# Patient Record
Sex: FEMALE | Race: OTHER | HISPANIC OR LATINO | URBAN - METROPOLITAN AREA
[De-identification: names, ages, dates, MRNs, and addresses within clinical notes are randomized per-mention and may not be internally consistent; named-entity substitution may affect disease eponyms.]

---
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
## 2023-06-16 ENCOUNTER — TELEPHONE (OUTPATIENT)
Dept: OBSTETRICS AND GYNECOLOGY | Facility: CLINIC | Age: 18
End: 2023-06-16
Payer: COMMERCIAL

## 2023-06-16 NOTE — TELEPHONE ENCOUNTER
Attempted to call pt with no answer. LM on voicemail advising her to call office with any questions. As far as I can see she has never seen Dr. Lauren.

## 2023-06-16 NOTE — TELEPHONE ENCOUNTER
----- Message from Paige Ferreira sent at 6/16/2023  9:14 AM CDT -----  Contact: self  Prior office visits dates pls call [phone] with any questions

## 2024-11-25 NOTE — PROGRESS NOTES
Neurology Headache Clinic - [facility]  New Patient Visit     Subjective      REFERRAL SOURCE  Self, Aaareferral          CHIEF COMPLAINT/REASON FOR REFERRAL  Headache     HISTORY OF PRESENT ILLNESS  Kaylee Longoria is a 19 y.o. woman with Weill-Marchesani syndrome (connective tissue disorder), benign hypermobility syndrome, dysmenorrhea, who is presenting to the [facility] for an office visit with a chief complaint of headache.     The patient states that they began to experience headaches around childhood. Headache was not bad at that time. She remembers taking over the counter medication (Advil) for pain that helped sometimes. She had headache with menstrual period and remembers that sound sensitivity. She had nausea without vomiting. In her teenage years, headache stayed the same. Over the last 3 years, headache has been the same until recently. She says that around 2024, headache worsened and became daily.      Current headache characteristics:  Headache Frequency:        Total: 30        Disablin     Duration of attacks: days  Timing to max pain: hours   Time of day when headache is worse?: yes - around the middle of the day  Headache location:    right frontal, temporal, and occipital  Quality: throbbing / pulsating  Intensity: mild moderate moderate-to-severe severe: moderate to severe  Associated symptoms: photophobia, phonophobia, aggravation with routine physical activity, and nausea  Unilateral cranial autonomic features or restlessness: none  Premonitory symptoms: sound sensitivity  Aura symptoms:   Type: sensory  not really associated with headache - infraorbital bilateral   Duration: migraine aura duration: 1 - 5 minutes (atypical for aura)  Headache Red Flags:        Fevers/Chills/Unintended Weight Loss: no        Focal weakness: no        Thunderclap onset: yes - 4-5 times per week        Start a headache with coughing/sneezing/bending over: no        Headache  "absolutely worse with certain positions (lying down vs standing up): yes - upright because not resting        Double vision: no        Loss of color vision: no        Pulsatile or whooshing tinnitus: yes - she hears different noises all the time  Triggers: yes - taking off her cochlear implant; weather changes for sure  Neck pain: yes - bilateral R>>L Radiation up and down  Jaw pain/cramping with chewing, e.g., starts/stops when chewing due to pain/fatigue, lockjaw/decreased opening or cramping (including tongue) when eating: no  Symptoms of dysautonomia: postural lightheadedness / dizziness, near fainting when upright, and feels full quickly (early satiety)     Prior non-pharm treatments (biofeedback, CBT, PT, chiropractor, acupuncture, massage): no  History of asthma, constipation, kidney stones: no  Psychiatric comorbidities: yes - anxiety  History of Head Trauma: no  Hypertension, Hyperlipidemia: no  Prior stroke: no  Coronary artery disease: no  Vascular malformation or aneurysm: no - unknown EDS subtype  Caffeine use: yes - 5-6 cups of coffee per day; dr sosa x 1  Sleep comorbidities: Snoring absent, witnessed apneas absent, sleep study no     Family history of headaches:  no     Last dilated eye exam: within the last 12 months Any abnormalities? no     Menstrual status:  Did attacks start around menarche? no  Does the patient currently get their period? yes  Worsening of migraine during menses? yes  Does the patient use contraception? Type? no  Is the patient currently pregnant or planning pregnancy in the near future? no  Is the patient menopausal? no  Using HRT? no     Social History:  The patient lives in [place], [place].   Employment: no - on leave currently from [employer] because of her pain   Tobacco use: no  Alcohol use: no  Substances: no  Mood: "Up and down"      ----------------     Current Acute Headache Medications:  -- Rizatriptan 10 mg - helps within 1 hr and decreases pain     Number of Days " "with acute medication use per week: 3      Current Prophylactic Headache Medications:  -- sertraline 25 mg daily     Previous headache treatment that was ineffective or not tolerated:  Acute: None  Preventive: fluoxetine 20 mg  Devices: None    Procedures: None    ----------------     Past Medical History:   Diagnosis Date    Deaf     Mental disorder         Current Outpatient Medications on File Prior to Visit   Medication Sig Dispense Refill    hyoscyamine 0.125 mg TbDL ODT tablet Take 0.125 mg by mouth 2 (two) times daily as needed.      l-methylfolate-b2-b6-b12 (CEREFOLIN) 6-5-50-1 mg Tab Take 1 tablet by mouth once daily.      levocetirizine (XYZAL) 5 MG tablet Take 5 mg by mouth every evening.      magnesium oxide (MAG-OX) 400 mg (241.3 mg magnesium) tablet Take 120 mg by mouth once daily.      sertraline (ZOLOFT) 25 MG tablet Take 25 mg by mouth.      traZODone (DESYREL) 50 MG tablet Take 50 mg by mouth every evening.      [DISCONTINUED] FLUoxetine 20 MG capsule Take by mouth.      ascorbic acid, vitamin C, (VITAMIN C) 500 MG tablet Take 500 mg by mouth once daily.      turmeric root-ginger root ext 150-25 mg Chew Take 250 mg by mouth.      [DISCONTINUED] norethindrone-ethinyl estradiol (MICROGESTIN 1/20) 1-20 mg-mcg per tablet Take 1 tablet by mouth once daily. 90 tablet 3     No current facility-administered medications on file prior to visit.        REVIEW OF SYSTEMS  As above     Objective      PHYSICAL EXAMINATION    Blood pressure 96/64, pulse 72, height 4' 11" (1.499 m), weight 62.5 kg (137 lb 12.6 oz).     General Exam: The patient is in no acute distress.  Psychiatric: The patient is alert, interactive, and has appropriate mood and affect.    Head and Neck:  Supratrochlear tenderness: Yes right  Supraorbital tenderness: Yes right  Auriculotemporal tenderness: Yes right  Lesser occipital tenderness: Yes right  Greater occipital tenderness: Yes right  Cervical paraspinal muscle tenderness: Yes " "right  Cervical ROM (normal flexion 50'; extension 80'; lateral flexion 45'; rotation 85'): Normal   Cervical facet loading: Negative bilateral  Spurling's sign: Negative bilateral     Neurologic Examination:  Mental Status: Mental status is normal to conversation. The patient is able to recall the details of their medical history without difficulty. Language is grossly intact with expected dysarthria    Cranial Nerves: Extraocular movements are intact. No nystagmus. Facial symmetry and strength is intact. Facial sensation is intact and equal bilaterally.  Impaired hearing bilateral.  Tongue protrudes in the midline. Symmetrical palate elevation.  Motor Examination: Full strength, normal tone, normal muscle bulk in upper and lower extremities. No tremor.    Coordination: No dysmetria on finger-nose-finger  Gait: Normal gait.       ----------------     DIAGNOSTIC DATA     Laboratory Data:     No results found for: "WBC", "HGB", "HCT", "MCV", "PLT"        CMP  No results found for: "NA", "K", "CL", "CO2", "GLU", "BUN", "CREATININE", "CALCIUM", "PROT", "ALBUMIN", "BILITOT", "ALKPHOS", "AST", "ALT", "ANIONGAP", "EGFRNORACEVR"     Imaging Data:    Xray:  None    CT:   None    MRI:  Unable to obtain due to cochlear implant     ---------------  Genetics [facility]:    Kaylee's genetic testing today does provide potential causes for her clinical presentation, but are not definitive. The most likely interpretation of the current testing is that her hearing loss is independent from some of the joint problems that she is having. At present, the parents are most concerned about the hearing loss and asked specifically about things like pressure syndrome, which we were able to reassure them does not appear to be the case in Kaylee.    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 She does not, however, have a second " variant, although deletions or duplications elsewhere in the gene cannot be ruled out from the testing that was done. For this reason, given the strong association with hearing loss, we would like to ask for a chromosome microarray to assess duplications and deletions, and also to evaluate whether or not there may be changes that are relevant again to the broader phenotype. This GJB2 variant was not found in her brother, who has normal hearing. The parents are not available. 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 This is an arginine substitution and is quite rare in gnomAD, and is predicted to be damaging. This is a cause of autosomal dominant deafness. Again, Kaylee's sibling is negative.    The change in the FBN1 gene is difficult to interpret. There are reports in the literature of in-frame deletions in FBN1 as a cause of Weill- Marchesani syndrome type 2 (https://www.ncbi.nlm.nih.gov/books/VHB9915/), which is characterized by short stature, joint stiffness with associated pain, and eye problems, typically with small eyes. Eye problems typically affect the eye in terms of the shape and the lens. Hypermobile joints are not a typical feature. Kaylee reports having worsening difficulty with her eyes. She does see an optometrist each year, and has had any significant problems noted, but has not seen an ophthalmologist, and we asked the family to facilitate this on this occasion. There is an association with potential congenital cardiac lesions in Weill-Marchesani, and Kaylee has had repeated echocardiograms, which have again been normal. FBN1 is associated with other syndromes that are not consistent with her current presentation, including Marfan syndrome, which typically has a tall stature and a series of other skeletal features that are not present today, as well as more specific neurocutaneous syndromes that are again not likely to be the case for Kaylee and not evident from her skeletal survey. Thus,  our best differential to work this up would be the Weill-Marchesani. Interestingly, although Kaylee's full brother has joint problems, including acetabular problems, he does not have this variant, and the variant itself is novel and not seen in the large public databases.    I had a prolonged discussion with the family today regarding genes and chromosomes, DNA, and particular interpretation thereof. We will ask for a chromosome microarray to try and tie up some of these initial findings, and will also ask for some expert help, both on the variant in TNC, as well as the variant in FBN1 from our colleagues who have a particular interest in hearing loss, genetics, and in skeletal dysplasias. Both parents and Kaylee were in agreement with the plan as outlined. We will plan to get back in touch with them as we have additional results. We have asked them to forward any information regarding the ophthalmology visits to us going forward.   ----------------     Assessment & Plan      Kaylee Longoria is a 19 y.o. woman with Weill-Marchesani syndrome (connective tissue disorder), benign hypermobility syndrome, dysmenorrhea, who is presenting to the [facility] for an office visit with a chief complaint of headache.     The patient states that they began to experience headaches around childhood. Headache was not bad at that time. She remembers taking over the counter medication (Advil) for pain that helped sometimes. She had headache with menstrual period and remembers that sound sensitivity. She had nausea without vomiting. In her teenage years, headache stayed the same. Over the last 3 years, headache has been the same until recently. She says that around 7/2024, headache worsened and became daily.  Headache is associated with light sensitivity, sound sensitivity, and aggravation with routine physical activity.  She has nausea without vomiting.  She was given rizatriptan 10 mg, which she finds to be  somewhat effective within 1 hour.  5 mg is not effective.  She was recently switched from fluoxetine to sertraline for depression/anxiety.     Exam notable for right supraorbital, auriculotemporal, and occipital notch tenderness.  No autonomic features to suggest hemicrania continua.  At this point, it is likely that the patient is experiencing ICHD 1.3 chronic migraine based on the presence of headache on >=15 days per month with >/= 8 days meeting criteria for migraine, which last >/= 4 - 72 hours without treatment and are characterized by occasional unilateral, pulsating/throbbing pain that is moderate-to-severe in intensity and aggravated by routine physical activity. They also experience photophobia and phonophobia and nausea and/or vomiting. This has been occurring for at least 3 months without improvement on their current regimen.  We discussed options for treatment and decided to start topiramate 25 mg at night for prevention.  She will continue to use rizatriptan and parent with either Reglan 10 mg or nabumetone 500 mg (or both).  Medication overuse was discussed today.     While the patient did endorse sudden onset 10/10 headache on the right side, I have a low concern for vascular malformation.  However, Tomy-Danlos type 3 was mentioned in her chart at some point [facility].  The patient and her mother are not sure what type of Tomy-Danlos she has, but her  note does not mention Tomy-Danlos type 3.  I have asked her mother to ask her EDS specialists what type of Tomy-Danlos she has because I would not be able to use Triptan in patient who has significant vascular malformations.     We performed a right lesser occipital nerve block local anesthetic which resolved her discomfort in that region behind her cochlear implant.  This was performed by first asking the patient to sit in a stool and place her head down on a pillow on the examination table.  Next, the site 1/3 of the way  between the mastoid and the inion (proximal to the mastoid) was identified.  Then, an alcohol swab was used to sterilize the expected site. A 27 gauge needle attached to a 3 cc syringe containing ropivacaine 0.5% was advanced at a 30 degree angle towards the vertex.  The skull was touched lately and then the needle was retracted very slightly.  Then, a small amount of local anesthetic was delivered and we waited 10 seconds before injecting the rest of the medication.  The patient experienced the expected numbness as well as immediate relief in her pain in that region.  I told the patient and her mother that this could be repeated in 2-3 weeks if desired given that there was no steroid in the injectate.     1. Chronic migraine  2. Migraine without aura  3. Sensory phenomenon, bilateral, not meeting criteria for her  4. Occipital neuralgia, right     -- Diagnostic studies and referrals recommended:  CTA head and neck  -- Preventive:  Topiramate 25 mg at night. Side effects discussed.  -- Supplements: Try Magnesium (oxide, glycinate, citrate, or combination) 400 mg by mouth twice per day (Side effect: stomach upset). Fine at local [employer] or [employer]. Try Riboflavin (VitB2) 200 mg by mouth twice per day (Side effect: bright yellow urine). Find at [employer] food store ([employer] foods, etc.). Alternatively, there is a combination pill that you can try that has 600 mg of magnesium citrate (can cause loose stools), 400 mg Riboflavin, 300 mg CoQ10, 3 mg Melatonin, and 4000 IU VitD3 called MigraineMD that you can try.  -- Abortive:  Rizatriptan 10 mg with nabumetone 500 mg  -- Nausea/Vomiting:  Reglan 10 mg. Side effects discussed.  -- Medication overuse discussed. Limit the use of as needed medications to less than 2 to 3 days per week or 10 days per month to reduce the risk of medication overuse complications.  -- Future options:  Increase topiramate, switch to zonisamide, anti CGRP agents (caution vascular EDS if  "present), Botox; repeat nerve blocks, cryoneurolysis of right lesser occipital nerve     -- Recommend lifestyle modifications including the SEEDS for success in headache management, including Sleep hygiene, Exercising regularly, Eating healthy and regular meals, Drinking water and maintaining a headache Diary, and Stress reduction.  -- Therapeutic education and advocacy:        -Access the Migraine Toolkit, [facility] Migraine Patient Toolkit        -Visit the American Migraine Foundation (americanmigrainefoundation.org) website and the Move Against Migraine Facebook group for additional patient education    Pain Psychology Resources:  -- "Harnessing the Power of your Thoughts for Pain Control" - [employer] [place] Back Pain Education Day 2016.   -- "Pain Catastrophizing" - Derek Iglesias education Youtube channel  -- Free 8-week Mindfulness Course - [place] Mindfulness-Based Stress Reduction  -- Uk[employer]Jpext Free Lucina for stress reduction developed by the [employer]       -- Follow-up recommended:  2 months     Nilton Graves MD  Headache and Pain Management  [facility]    The total time spent for evaluation and management on including reviewing separately obtained history, performing a medically appropriate exam and evaluation, documenting clinical information in the health record, independently interpreting results and communicating them to the patient/family/caregiver, and ordering medications/tests/procedures was 60 minutes.    "

## 2024-11-26 ENCOUNTER — OFFICE VISIT (OUTPATIENT)
Dept: NEUROLOGY | Facility: CLINIC | Age: 19
End: 2024-11-26
Payer: COMMERCIAL

## 2024-11-26 ENCOUNTER — TELEPHONE (OUTPATIENT)
Dept: NEUROLOGY | Facility: CLINIC | Age: 19
End: 2024-11-26

## 2024-11-26 VITALS
HEIGHT: 59 IN | WEIGHT: 137.81 LBS | SYSTOLIC BLOOD PRESSURE: 96 MMHG | HEART RATE: 72 BPM | DIASTOLIC BLOOD PRESSURE: 64 MMHG | BODY MASS INDEX: 27.78 KG/M2

## 2024-11-26 DIAGNOSIS — M54.81 OCCIPITAL NEURALGIA OF RIGHT SIDE: ICD-10-CM

## 2024-11-26 DIAGNOSIS — G44.53 THUNDERCLAP HEADACHE: ICD-10-CM

## 2024-11-26 DIAGNOSIS — G43.709 CHRONIC MIGRAINE WITHOUT AURA WITHOUT STATUS MIGRAINOSUS, NOT INTRACTABLE: Primary | ICD-10-CM

## 2024-11-26 DIAGNOSIS — R42 DIZZINESS AND GIDDINESS: ICD-10-CM

## 2024-11-26 PROBLEM — R10.33 PERIUMBILICAL ABDOMINAL PAIN: Status: ACTIVE | Noted: 2017-07-11

## 2024-11-26 PROBLEM — M22.2X1 PATELLOFEMORAL PAIN SYNDROME OF RIGHT KNEE: Status: ACTIVE | Noted: 2022-10-12

## 2024-11-26 PROBLEM — Q87.0: Status: ACTIVE | Noted: 2022-10-12

## 2024-11-26 PROBLEM — M35.7 BENIGN HYPERMOBILITY SYNDROME: Status: ACTIVE | Noted: 2018-07-11

## 2024-11-26 PROCEDURE — 99999 PR PBB SHADOW E&M-EST. PATIENT-LVL III: CPT | Mod: PBBFAC,,, | Performed by: INTERNAL MEDICINE

## 2024-11-26 RX ORDER — MULTIVIT-MIN/FOLIC AC/COLLAGEN 0.2MG-25MG
250 TABLET,CHEWABLE ORAL
COMMUNITY

## 2024-11-26 RX ORDER — HYOSCYAMINE SULFATE 0.12 MG/1
0.12 TABLET, ORALLY DISINTEGRATING ORAL 2 TIMES DAILY PRN
COMMUNITY
Start: 2024-11-10

## 2024-11-26 RX ORDER — RIZATRIPTAN BENZOATE 10 MG/1
10 TABLET, ORALLY DISINTEGRATING ORAL 2 TIMES DAILY PRN
Qty: 10 TABLET | Refills: 2 | Status: SHIPPED | OUTPATIENT
Start: 2024-11-26 | End: 2024-12-26

## 2024-11-26 RX ORDER — ASCORBIC ACID 500 MG
500 TABLET ORAL DAILY
COMMUNITY

## 2024-11-26 RX ORDER — LANOLIN ALCOHOL/MO/W.PET/CERES
120 CREAM (GRAM) TOPICAL DAILY
COMMUNITY

## 2024-11-26 RX ORDER — METOCLOPRAMIDE 10 MG/1
10 TABLET ORAL 2 TIMES DAILY PRN
Qty: 20 TABLET | Refills: 2 | Status: SHIPPED | OUTPATIENT
Start: 2024-11-26

## 2024-11-26 RX ORDER — TOPIRAMATE 25 MG/1
25 TABLET ORAL NIGHTLY
Qty: 30 TABLET | Refills: 11 | Status: SHIPPED | OUTPATIENT
Start: 2024-11-26 | End: 2025-11-26

## 2024-11-26 RX ORDER — SERTRALINE HYDROCHLORIDE 25 MG/1
25 TABLET, FILM COATED ORAL
COMMUNITY
Start: 2024-10-31

## 2024-11-26 RX ORDER — NABUMETONE 500 MG/1
500 TABLET, FILM COATED ORAL 2 TIMES DAILY PRN
Qty: 20 TABLET | Refills: 3 | Status: SHIPPED | OUTPATIENT
Start: 2024-11-26

## 2024-11-26 RX ORDER — LEVOCETIRIZINE DIHYDROCHLORIDE 5 MG/1
5 TABLET, FILM COATED ORAL NIGHTLY
COMMUNITY

## 2024-11-26 RX ORDER — TRAZODONE HYDROCHLORIDE 50 MG/1
50 TABLET ORAL NIGHTLY
COMMUNITY

## 2024-11-26 NOTE — PROCEDURES
"Lesser Occipital Nerve Block    Date/Time: 11/26/2024 8:00 AM  Location procedure was performed: [facility]    Performed by: Nilton Graves MD  Authorized by: Nilton Graves MD  Pre-operative diagnosis: Occipital neuralgia  Post-operative diagnosis: Occipital neuralgia  Consent Done: Yes  Site marked: the operative site was marked  Time out: Immediately prior to procedure a "time out" was called to verify the correct patient, procedure, equipment, support staff and site/side marked as required.  Indications: pain relief  Body area: head  Nerve: lesser occipital  Laterality: right    Patient sedated: no  Preparation: Patient was prepped and draped in the usual sterile fashion.  Patient position: sitting  Needle size: 27 G  Location technique: anatomical landmarks  Local anesthetic: Ropivacaine 0.5%  Anesthetic total: 3 mL  Complications: No  Estimated blood loss (mL): 0  Specimens: No  Implants: No  Outcome: pain improved      Verbal consent was completed. Pt acknowledged the risks of the procedure include (but not necessarily limited to) pain, bleeding, bruising, infection at the injection site, nerve injury / damage, and ineffectiveness. Pt wished to proceed with the procedure.     Nilton Graves MD  Headache and Pain Management  [facility]  "

## 2024-11-26 NOTE — TELEPHONE ENCOUNTER
----- Message from Nilton Graves MD sent at 11/26/2024  2:25 PM CST -----  Got it!  ----- Message -----  From: Kateryna Mitchell MA  Sent: 11/26/2024   2:21 PM CST  To: Nilton Graves MD    Hi there Dr. Graves.     Can you please change the orders to external?  ----- Message -----  From: Sharon Mejía  Sent: 11/26/2024   2:16 PM CST  To: [facility] Staff     Type:  Needs Medical Advice    Who Called: Dipika/mom      Would the patient rather a call back or a response via [facility]? call  Best Call Back Number: [phone]      Additional Information: PT mom need CT order to be faxed to [facility] [phone] Please call back to advise. Thank you!

## 2024-11-26 NOTE — PATIENT INSTRUCTIONS
-- Get your CTA of the head and neck in [place]  -- Try topiramate at night   -- Try rizatriptan 10 mg with Reglan 10 mg and Nabumetone 500 mg for rescue therapy  -- Follow-up in 2 month

## 2024-11-26 NOTE — TELEPHONE ENCOUNTER
Spoke with patient's mother regarding CT referral being faxed to [place]. Informed her that order has been faxed to number provided. Patient's mother v/u.

## 2025-01-16 ENCOUNTER — TELEPHONE (OUTPATIENT)
Dept: NEUROLOGY | Facility: CLINIC | Age: 20
End: 2025-01-16
Payer: COMMERCIAL

## 2025-01-16 NOTE — TELEPHONE ENCOUNTER
Spoke with patient's mother regarding appointment on the 29th of January, informed her that I will need to reschedule the appointment to a later time on the same day due to changes in the schedule. Patient's mother v/u. And will be scheduled for 2pm with Dr. Graves on 01-29-25.

## 2025-01-23 ENCOUNTER — TELEPHONE (OUTPATIENT)
Dept: NEUROLOGY | Facility: CLINIC | Age: 20
End: 2025-01-23
Payer: COMMERCIAL

## 2025-01-23 NOTE — TELEPHONE ENCOUNTER
----- Message from Jessica sent at 1/23/2025  2:16 PM CST -----  Contact: ALIZE,   Type:  RESCHEDULE Apoointment Request    Caller is requesting a sooner appointment.  Caller declined first available appointment listed below.  Caller will not accept being placed on the waitlist and is requesting a message be sent to doctor.  Name of Caller: ALIZE, MOTHER   When is the first available appointment? CURRENTLY NIKKI  ON 01/29/25 IN OFFICE   Symptoms: F/U   Would the patient rather a call back or a response via [facility]? CALL   Best Call Back Number: [phone]  Additional Information: PT WOULD LIKE A VIRTUAL APPT   THANK YOU

## 2025-01-29 ENCOUNTER — TELEPHONE (OUTPATIENT)
Dept: NEUROLOGY | Facility: CLINIC | Age: 20
End: 2025-01-29
Payer: COMMERCIAL

## 2025-01-29 NOTE — TELEPHONE ENCOUNTER
----- Message from Ari sent at 1/29/2025  1:39 PM CST -----  Regarding: appt  Contact: NOEL BURGESS [[id number]]  Type:  Sooner Appointment Request    Caller is requesting a sooner appointment.      Name of Caller:  Dipika, mother    When is the first available appointment?  None through [facility]    Symptoms:  r/s 1/29    Would the patient rather a call back or a response via [facility]? Call    Best Call Back Number:  [phone]    Additional Information:  Please call to advise.

## 2025-01-29 NOTE — TELEPHONE ENCOUNTER
Spoke with patient's mother regarding appointment. Patient will not be able to log in for vv and needs to r/s. Appointment rescheduled to 02-04-25 at 11 am with Dr. Graves.

## 2025-02-04 ENCOUNTER — OFFICE VISIT (OUTPATIENT)
Dept: NEUROLOGY | Facility: CLINIC | Age: 20
End: 2025-02-04
Payer: COMMERCIAL

## 2025-02-04 DIAGNOSIS — M54.81 OCCIPITAL NEURALGIA OF RIGHT SIDE: ICD-10-CM

## 2025-02-04 DIAGNOSIS — G43.709 CHRONIC MIGRAINE WITHOUT AURA WITHOUT STATUS MIGRAINOSUS, NOT INTRACTABLE: Primary | ICD-10-CM

## 2025-02-04 PROCEDURE — 1160F RVW MEDS BY RX/DR IN RCRD: CPT | Mod: CPTII,93,, | Performed by: INTERNAL MEDICINE

## 2025-02-04 PROCEDURE — 1159F MED LIST DOCD IN RCRD: CPT | Mod: CPTII,93,, | Performed by: INTERNAL MEDICINE

## 2025-02-04 PROCEDURE — 98012 SYNCH AUDIO-ONLY EST SF 10: CPT | Mod: 93,,, | Performed by: INTERNAL MEDICINE

## 2025-02-04 RX ORDER — RIZATRIPTAN BENZOATE 10 MG/1
10 TABLET, ORALLY DISINTEGRATING ORAL 2 TIMES DAILY PRN
Qty: 10 TABLET | Refills: 2 | Status: SHIPPED | OUTPATIENT
Start: 2025-02-04 | End: 2026-02-04

## 2025-02-04 RX ORDER — TOPIRAMATE 50 MG/1
50 TABLET, FILM COATED ORAL NIGHTLY
Qty: 30 TABLET | Refills: 11 | Status: SHIPPED | OUTPATIENT
Start: 2025-02-04 | End: 2026-02-04

## 2025-02-04 NOTE — PROGRESS NOTES
Neurology Headache Clinic - [facility]  Return Patient Visit    Telemedicine     The patient location is: [place]  Visit type: Virtual visit with audio only  Each patient to whom he or she provides medical services by telemedicine is:  (1) informed of the relationship between the physician and patient and the respective role of any other health care provider with respect to management of the patient; and (2) notified that he or she may decline to receive medical services by telemedicine and may withdraw from such care at any time.     Subjective      REFERRAL SOURCE  No ref. provider found          CHIEF COMPLAINT/REASON FOR REFERRAL  Headache     HISTORY OF PRESENT ILLNESS  Kaylee Longoria is a 19 y.o. woman with Weill-Marchesani syndrome (connective tissue disorder), benign hypermobility syndrome, dysmenorrhea, who is presenting to the [facility] for an office visit with a chief complaint of headache.     The patient states that they began to experience headaches around childhood. Headache was not bad at that time. She remembers taking over the counter medication (Advil) for pain that helped sometimes. She had headache with menstrual period and remembers that sound sensitivity. She had nausea without vomiting. In her teenage years, headache stayed the same. Over the last 3 years, headache has been the same until recently. She says that around 2024, headache worsened and became daily.      NPV headache characteristics:  Headache Frequency:        Total: 30        Disablin     Duration of attacks: days  Timing to max pain: hours   Time of day when headache is worse?: yes - around the middle of the day  Headache location:    right frontal, temporal, and occipital  Quality: throbbing / pulsating  Intensity: mild moderate moderate-to-severe severe: moderate to severe  Associated symptoms: photophobia, phonophobia, aggravation with routine physical activity, and nausea  Unilateral  cranial autonomic features or restlessness: none  Premonitory symptoms: sound sensitivity  Aura symptoms:   Type: sensory  not really associated with headache - infraorbital bilateral   Duration: migraine aura duration: 1 - 5 minutes (atypical for aura)  Headache Red Flags:        Fevers/Chills/Unintended Weight Loss: no        Focal weakness: no        Thunderclap onset: yes - 4-5 times per week        Start a headache with coughing/sneezing/bending over: no        Headache absolutely worse with certain positions (lying down vs standing up): yes - upright because not resting        Double vision: no        Loss of color vision: no        Pulsatile or whooshing tinnitus: yes - she hears different noises all the time  Triggers: yes - taking off her cochlear implant; weather changes for sure  Neck pain: yes - bilateral R>>L Radiation up and down  Jaw pain/cramping with chewing, e.g., starts/stops when chewing due to pain/fatigue, lockjaw/decreased opening or cramping (including tongue) when eating: no  Symptoms of dysautonomia: postural lightheadedness / dizziness, near fainting when upright, and feels full quickly (early satiety)     Prior non-pharm treatments (biofeedback, CBT, PT, chiropractor, acupuncture, massage): no  History of asthma, constipation, kidney stones: no  Psychiatric comorbidities: yes - anxiety  History of Head Trauma: no  Hypertension, Hyperlipidemia: no  Prior stroke: no  Coronary artery disease: no  Vascular malformation or aneurysm: no - unknown EDS subtype  Caffeine use: yes - 5-6 cups of coffee per day; dr pepper x 1  Sleep comorbidities: Snoring absent, witnessed apneas absent, sleep study no     Family history of headaches:  no     Last dilated eye exam: within the last 12 months Any abnormalities? no     Menstrual status:  Did attacks start around menarche? no  Does the patient currently get their period? yes  Worsening of migraine during menses? yes  Does the patient use contraception?  "Type? no  Is the patient currently pregnant or planning pregnancy in the near future? no  Is the patient menopausal? no  Using HRT? no     Social History:  The patient lives in [place], [place].   Employment: no - on leave currently from [employer] because of her pain   Tobacco use: no  Alcohol use: no  Substances: no  Mood: "Up and down"    ----------------  Interval Events:  2/4/2025: Last seen for NPV 11/26/2024. Plan was CTA head / neck, topiramate 25 mg at night, reglan/nabumetone for rescue. Asked the patient's mom to inquire about the type of EDS that the patient has. Since then, CTA was performed and was negative. The patient states that her pain is improved. She states that she has been having 15 headaches days per month. Of those days, she has had around 3 days of disability per month.        ----------------     Current Acute Headache Medications:  -- Rizatriptan 10 mg - helpful  -- Reglan 10 mg - not sure  -- Nabumetone 500 mg - not sure     Number of Days with acute medication use per week:  2     Current Prophylactic Headache Medications:  -- sertraline 25 mg daily  -- topiramate 25 mg nightly  -- Magnesium oxide 400 mg     Previous headache treatment that was ineffective or not tolerated:  Acute: None  Preventive: fluoxetine 20 mg  Devices: None    Procedures:   -- 11/2024 - right occipital nerve block / lateral occipitalis - 80% improvement with ongoing benefit    ----------------     Past Medical History:   Diagnosis Date    Deaf     Mental disorder         Current Outpatient Medications on File Prior to Visit   Medication Sig Dispense Refill    ascorbic acid, vitamin C, (VITAMIN C) 500 MG tablet Take 500 mg by mouth once daily.      hyoscyamine 0.125 mg TbDL ODT tablet Take 0.125 mg by mouth 2 (two) times daily as needed.      l-methylfolate-b2-b6-b12 (CEREFOLIN) 6-5-50-1 mg Tab Take 1 tablet by mouth once daily.      levocetirizine (XYZAL) 5 MG tablet Take 5 mg by mouth every evening.      magnesium " "oxide (MAG-OX) 400 mg (241.3 mg magnesium) tablet Take 120 mg by mouth once daily.      metoclopramide HCl (REGLAN) 10 MG tablet Take 1 tablet (10 mg total) by mouth 2 (two) times daily as needed (migraine or nausea). Take with Benadryl 12.5 or 25 mg to reduce risk of restlessness and involuntary muscle contraction (dystonia). 20 tablet 2    nabumetone (RELAFEN) 500 MG tablet Take 1 tablet (500 mg total) by mouth 2 (two) times daily as needed for Pain. 20 tablet 3    sertraline (ZOLOFT) 25 MG tablet Take 25 mg by mouth.      topiramate (TOPAMAX) 25 MG tablet Take 1 tablet (25 mg total) by mouth every evening. 30 tablet 11    traZODone (DESYREL) 50 MG tablet Take 50 mg by mouth every evening.      turmeric root-ginger root ext 150-25 mg Chew Take 250 mg by mouth.      [DISCONTINUED] rizatriptan (MAXALT-MLT) 10 MG disintegrating tablet Take 1 tablet (10 mg total) by mouth 2 (two) times daily as needed for Migraine. May repeat in 2 hours if needed. Avoid other triptans or DHE (Migranal) in the same 24 hr period. Do not use more than 10 days per month or risk medication overuse headache. 10 tablet 2     No current facility-administered medications on file prior to visit.        REVIEW OF SYSTEMS  As above     Objective      PHYSICAL EXAMINATION    There were no vitals taken for this visit.     General Exam: The patient is in no acute distress.  Psychiatric: The patient is alert, interactive, and has appropriate mood and affect.    Neurologic Examination:  Mental Status: Mental status is normal to conversation. The patient is able to recall the details of their medical history without difficulty. Language is grossly intact with expected dysarthria      ----------------     DIAGNOSTIC DATA     Laboratory Data:     No results found for: "WBC", "HGB", "HCT", "MCV", "PLT"    CMP  No results found for: "NA", "K", "CL", "CO2", "GLU", "BUN", "CREATININE", "CALCIUM", "PROT", "ALBUMIN", "BILITOT", "ALKPHOS", "AST", "ALT", " ""ANIONGAP", "EGFRNORACEVR"     Imaging Data:    Xray:  None    CT:   12/2024  FINDINGS:   Surrounding soft tissues are unremarkable.   Aortic arch: Normal branching pattern. No significant stenosis.     Right carotid: The common carotid artery is patent without significant stenosis. The ICA is patent. T   here is  0 % stenosis calculated utilizing the aforementioned NASCET criteria. No dissection.     Left carotid: The common carotid artery is patent without significant stenosis. The ICA is patent.  T   here is  0 % stenosis calculated utilizing the aforementioned NASCET criteria. No dissection.     Vertebrals: Both vertebral arteries are patent without any significant stenosis.  No dissection.     There is no significant surrounding soft tissue abnormality. No acute or aggressive osseous lesion ab   normality.     IMPRESSION:   No hemodynamically/flow-limiting significant stenosis or vessel occlusion. No dissection seen.   Unremarkable CTA of the neck.     MRI:  Unable to obtain due to cochlear implant     ---------------  Genetics [facility] 2019:    Kaylee's genetic testing today does provide potential causes for her clinical presentation, but are not definitive. The most likely interpretation of the current testing is that her hearing loss is independent from some of the joint problems that she is having. At present, the parents are most concerned about the hearing loss and asked specifically about things like pressure syndrome, which we were able to reassure them does not appear to be the case in Kaylee.    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 She does not, however, have a second variant, although deletions or duplications elsewhere in the gene cannot be ruled out from the testing that was done. For this reason, given the strong association with hearing loss, we would like to ask for a chromosome " microarray to assess duplications and deletions, and also to evaluate whether or not there may be changes that are relevant again to the broader phenotype. This GJB2 variant was not found in her brother, who has normal hearing. The parents are not available. 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 This is an arginine substitution and is quite rare in gnomAD, and is predicted to be damaging. This is a cause of autosomal dominant deafness. Again, Kaylee's sibling is negative.    The change in the FBN1 gene is difficult to interpret. There are reports in the literature of in-frame deletions in FBN1 as a cause of Weill- Marchesani syndrome type 2 (https://www.ncbi.nlm.nih.gov/books/KDL4184/), which is characterized by short stature, joint stiffness with associated pain, and eye problems, typically with small eyes. Eye problems typically affect the eye in terms of the shape and the lens. Hypermobile joints are not a typical feature. Kaylee reports having worsening difficulty with her eyes. She does see an optometrist each year, and has had any significant problems noted, but has not seen an ophthalmologist, and we asked the family to facilitate this on this occasion. There is an association with potential congenital cardiac lesions in Weill-Mabelani, and Kaylee has had repeated echocardiograms, which have again been normal. FBN1 is associated with other syndromes that are not consistent with her current presentation, including Marfan syndrome, which typically has a tall stature and a series of other skeletal features that are not present today, as well as more specific neurocutaneous syndromes that are again not likely to be the case for Kaylee and not evident from her skeletal survey. Thus, our best differential to work this up would be the Weill-Marchesani. Interestingly, although Kaylee's full brother has joint problems, including acetabular problems, he does not have this variant, and the variant itself  is novel and not seen in the large public databases.    I had a prolonged discussion with the family today regarding genes and chromosomes, DNA, and particular interpretation thereof. We will ask for a chromosome microarray to try and tie up some of these initial findings, and will also ask for some expert help, both on the variant in TNC, as well as the variant in FBN1 from our colleagues who have a particular interest in hearing loss, genetics, and in skeletal dysplasias. Both parents and Kaylee were in agreement with the plan as outlined. We will plan to get back in touch with them as we have additional results. We have asked them to forward any information regarding the ophthalmology visits to us going forward.   ----------------     Assessment & Plan      Kaylee Longoria is a 19 y.o. woman with Weill-Marchesani syndrome (connective tissue disorder), benign hypermobility syndrome, dysmenorrhea, who is presenting to the [facility] for an office visit with a chief complaint of headache.     Last seen for [facility] 11/26/2024. Plan was CTA head / neck, topiramate 25 mg at night, reglan/nabumetone for rescue. Asked the patient's mom to inquire about the type of EDS that the patient has. Since then, CTA was performed and was negative. The patient states that her pain is improved. She states that she has been having 15 headaches days per month. Of those days, she has had around 3 days of disability per month.     Significant improvement. Will increase topiramate to 50 mg at night and she can continue her rescue regimen.     1. Chronic migraine, improving  2. Migraine without aura  3. Sensory phenomenon, bilateral, not meeting criteria for her  4. Occipital neuralgia, right     -- Diagnostic studies and referrals recommended:  None  -- Preventive:  Topiramate 50 mg at night. Side effects discussed.  -- Supplements: Try Magnesium (oxide, glycinate, citrate, or combination) 400 mg by mouth twice per day  "(Side effect: stomach upset). Fine at local [employer] or [employer] food store. Try Riboflavin (VitB2) 200 mg by mouth twice per day (Side effect: bright yellow urine). Find at [employer] food store (Whole foods, etc.). Alternatively, there is a combination pill that you can try that has 600 mg of magnesium citrate (can cause loose stools), 400 mg Riboflavin, 300 mg CoQ10, 3 mg Melatonin, and 4000 IU VitD3 called MigraineMD that you can try.  -- Abortive:  Rizatriptan 10 mg with nabumetone 500 mg  -- Nausea/Vomiting:  Reglan 10 mg. Side effects discussed.  -- Medication overuse discussed. Limit the use of as needed medications to less than 2 to 3 days per week or 10 days per month to reduce the risk of medication overuse complications.  -- Future options:  Increase topiramate, switch to zonisamide, anti CGRP agents (caution vascular EDS if present), Botox; repeat nerve blocks, cryoneurolysis of right lesser occipital nerve     -- Recommend lifestyle modifications including the SEEDS for success in headache management, including Sleep hygiene, Exercising regularly, Eating healthy and regular meals, Drinking water and maintaining a headache Diary, and Stress reduction.  -- Therapeutic education and advocacy:        -Access the Migraine Toolkit, [facility] Migraine Patient Toolkit        -Visit the American Migraine Foundation (americanmigrainefoundation.org) website and the Move Against Migraine Facebook group for additional patient education    Pain Psychology Resources:  -- "Harnessing the Power of your Thoughts for Pain Control" - [employer] [place] Back Pain Education Day 2016.   -- "Pain Catastrophizing" - Derek Iglesias education Youtube channel  -- Free 8-week Mindfulness Course - [place] Mindfulness-Based Stress Reduction  -- [employer] Free Lucina for stress reduction developed by the [employer] for [employer]       -- Follow-up recommended:  2 months virtual     Nilton Graves, " MD  Headache and Pain Management  [facility]    The total time spent for evaluation and management on including reviewing separately obtained history, performing a medically appropriate exam and evaluation, documenting clinical information in the health record, independently interpreting results and communicating them to the patient/family/caregiver, and ordering medications/tests/procedures was 14 minutes. Over 10 minutes of the 14 minute visit was spent with medical discussion.    Level 2 audio only (video not available per mom)

## 2025-02-04 NOTE — PATIENT INSTRUCTIONS
-- Increase topiramate to 50 mg at night  -- Continue rizatriptan, reglan, and nabumetone  -- follow-up in 2 months virtually

## 2025-02-11 ENCOUNTER — TELEPHONE (OUTPATIENT)
Dept: NEUROLOGY | Facility: CLINIC | Age: 20
End: 2025-02-11
Payer: COMMERCIAL

## 2025-02-11 NOTE — TELEPHONE ENCOUNTER
Spoke with patient's mother regarding getting report. Informed her that she will need to call the facility that CT were done at to receive disc and report.

## 2025-02-11 NOTE — TELEPHONE ENCOUNTER
----- Message from Barbara sent at 2/11/2025  1:40 PM CST -----  Regarding: Needs Medical Document  Contact: Dipika mace at [phone]  Type: Needs Medical Document    Who Called:  Dipika mace at [phone]    Additional Information: patient needs a copy of the CT scan done in November. She would like to print out a copy to put in with her records. If it can be uploaded into in patient portal they will be able to download it. Please call and advise. Thank you

## 2025-02-19 ENCOUNTER — TELEPHONE (OUTPATIENT)
Dept: NEUROLOGY | Facility: CLINIC | Age: 20
End: 2025-02-19
Payer: COMMERCIAL

## 2025-02-19 NOTE — TELEPHONE ENCOUNTER
----- Message from Barbara sent at 2/19/2025  1:23 PM CST -----  Regarding: Sooner Virtual Appointment Request  Contact: mother, at [phone]  Type:  Sooner Virtual Appointment RequestName of Caller:  mother, at [phone] is the first available appointment?  noneSymptoms:  6 week follow up from 2/4Additional Information:  Please call and advise. Thank you

## 2025-02-19 NOTE — TELEPHONE ENCOUNTER
Spoke with patient regarding scheduling a 6 wk f/u virtually with Dr. Graves. Patient is scheduled for 03-18 at 1 pm.

## 2025-03-17 NOTE — PROGRESS NOTES
Neurology Headache [facility] - [facility]  Return Patient Visit    Telemedicine     The patient location is: [place]  Visit type: Virtual visit with audio and visual only  Each patient to whom he or she provides medical services by telemedicine is:  (1) informed of the relationship between the physician and patient and the respective role of any other health care provider with respect to management of the patient; and (2) notified that he or she may decline to receive medical services by telemedicine and may withdraw from such care at any time.     Subjective      REFERRAL SOURCE  No ref. provider found          CHIEF COMPLAINT/REASON FOR REFERRAL  Headache     HISTORY OF PRESENT ILLNESS  Kaylee Longoria is a 20 y.o. woman with Weill-Marchesani syndrome (connective tissue disorder), benign hypermobility syndrome, dysmenorrhea, who is presenting to the [facility] for an office visit with a chief complaint of headache.     The patient states that they began to experience headaches around childhood. Headache was not bad at that time. She remembers taking over the counter medication (Advil) for pain that helped sometimes. She had headache with menstrual period and remembers that sound sensitivity. She had nausea without vomiting. In her teenage years, headache stayed the same. Over the last 3 years, headache has been the same until recently. She says that around 2024, headache worsened and became daily.      NPV headache characteristics:  Headache Frequency:        Total: 30        Disablin     Duration of attacks: days  Timing to max pain: hours   Time of day when headache is worse?: yes - around the middle of the day  Headache location:    right frontal, temporal, and occipital  Quality: throbbing / pulsating  Intensity: mild moderate moderate-to-severe severe: moderate to severe  Associated symptoms: photophobia, phonophobia, aggravation with routine physical activity, and  nausea  Unilateral cranial autonomic features or restlessness: none  Premonitory symptoms: sound sensitivity  Aura symptoms:   Type: sensory  not really associated with headache - infraorbital bilateral   Duration: migraine aura duration: 1 - 5 minutes (atypical for aura)  Headache Red Flags:        Fevers/Chills/Unintended Weight Loss: no        Focal weakness: no        Thunderclap onset: yes - 4-5 times per week        Start a headache with coughing/sneezing/bending over: no        Headache absolutely worse with certain positions (lying down vs standing up): yes - upright because not resting        Double vision: no        Loss of color vision: no        Pulsatile or whooshing tinnitus: yes - she hears different noises all the time  Triggers: yes - taking off her cochlear implant; weather changes for sure  Neck pain: yes - bilateral R>>L Radiation up and down  Jaw pain/cramping with chewing, e.g., starts/stops when chewing due to pain/fatigue, lockjaw/decreased opening or cramping (including tongue) when eating: no  Symptoms of dysautonomia: postural lightheadedness / dizziness, near fainting when upright, and feels full quickly (early satiety)     Prior non-pharm treatments (biofeedback, CBT, PT, chiropractor, acupuncture, massage): no  History of asthma, constipation, kidney stones: no  Psychiatric comorbidities: yes - anxiety  History of Head Trauma: no  Hypertension, Hyperlipidemia: no  Prior stroke: no  Coronary artery disease: no  Vascular malformation or aneurysm: no - unknown EDS subtype  Caffeine use: yes - 5-6 cups of coffee per day; dr sosa x 1  Sleep comorbidities: Snoring absent, witnessed apneas absent, sleep study no     Family history of headaches:  no     Last dilated eye exam: within the last 12 months Any abnormalities? no     Menstrual status:  Did attacks start around menarche? no  Does the patient currently get their period? yes  Worsening of migraine during menses? yes  Does the patient  "use contraception? Type? no  Is the patient currently pregnant or planning pregnancy in the near future? no  Is the patient menopausal? no  Using HRT? no     Social History:  The patient lives in [place], [place].   Employment: no - on leave currently from [employer] because of her pain   Tobacco use: no  Alcohol use: no  Substances: no  Mood: "Up and down"    ----------------  Interval Events:  2/4/2025: Last seen for NPV 11/26/2024. Plan was CTA head / neck, topiramate 25 mg at night, reglan/nabumetone for rescue. Asked the patient's mom to inquire about the type of EDS that the patient has. Since then, CTA was performed and was negative. The patient states that her pain is improved. She states that she has been having 15 headaches days per month. Of those days, she has had around 3 days of disability per month.     3/18/2025: Last seen 2/4/2025. Plan was to increase topiramate to 50 mg at night. Headache on 10 days per month and 2 days are disabling. She says that her dizziness is no longer an issue. No side effects on the topiramate. She has tried rizatriptan 10 mg and it takes 30 min to work. No side effects.        ----------------     Current Acute Headache Medications:  -- Rizatriptan 10 mg - helpful  -- Reglan 10 mg - not sure  -- Nabumetone 500 mg - not sure     Number of Days with acute medication use per week:  2     Current Prophylactic Headache Medications:  -- sertraline 25 mg daily  -- topiramate 50 mg nightly  -- Magnesium oxide 400 mg     Previous headache treatment that was ineffective or not tolerated:  Acute: None  Preventive: fluoxetine 20 mg  Devices: None    Procedures:   -- 11/2024 - right occipital nerve block / lateral occipitalis - 80% improvement with ongoing benefit    ----------------     Past Medical History:   Diagnosis Date    Deaf     Mental disorder         Current Outpatient Medications on File Prior to Visit   Medication Sig Dispense Refill    ascorbic acid, vitamin C, (VITAMIN C) " 500 MG tablet Take 500 mg by mouth once daily.      hyoscyamine 0.125 mg TbDL ODT tablet Take 0.125 mg by mouth 2 (two) times daily as needed.      l-methylfolate-b2-b6-b12 (CEREFOLIN) 6-5-50-1 mg Tab Take 1 tablet by mouth once daily.      levocetirizine (XYZAL) 5 MG tablet Take 5 mg by mouth every evening.      magnesium oxide (MAG-OX) 400 mg (241.3 mg magnesium) tablet Take 120 mg by mouth once daily.      metoclopramide HCl (REGLAN) 10 MG tablet Take 1 tablet (10 mg total) by mouth 2 (two) times daily as needed (migraine or nausea). Take with Benadryl 12.5 or 25 mg to reduce risk of restlessness and involuntary muscle contraction (dystonia). 20 tablet 2    nabumetone (RELAFEN) 500 MG tablet Take 1 tablet (500 mg total) by mouth 2 (two) times daily as needed for Pain. 20 tablet 3    rizatriptan (MAXALT-MLT) 10 MG disintegrating tablet Take 1 tablet (10 mg total) by mouth 2 (two) times daily as needed for Migraine. May repeat in 2 hours if needed. Avoid other triptans or DHE (Migranal) in the same 24 hr period. Do not use more than 10 days per month or risk medication overuse headache. 10 tablet 2    sertraline (ZOLOFT) 25 MG tablet Take 25 mg by mouth.      topiramate (TOPAMAX) 50 MG tablet Take 1 tablet (50 mg total) by mouth every evening. 30 tablet 11    traZODone (DESYREL) 50 MG tablet Take 50 mg by mouth every evening.      turmeric root-ginger root ext 150-25 mg Chew Take 250 mg by mouth.       No current facility-administered medications on file prior to visit.        REVIEW OF SYSTEMS  As above     Objective      PHYSICAL EXAMINATION    There were no vitals taken for this visit.     General Exam: The patient is in no acute distress.  Psychiatric: The patient is alert, interactive, and has appropriate mood and affect.    Neurologic Examination:  Mental Status: Mental status is normal to conversation. The patient is able to recall the details of their medical history without difficulty. Language is grossly  "intact with expected dysarthria      ----------------     DIAGNOSTIC DATA     Laboratory Data:     No results found for: "WBC", "HGB", "HCT", "MCV", "PLT"    CMP  No results found for: "NA", "K", "CL", "CO2", "GLU", "BUN", "CREATININE", "CALCIUM", "PROT", "ALBUMIN", "BILITOT", "ALKPHOS", "AST", "ALT", "ANIONGAP", "EGFRNORACEVR"     Imaging Data:    Xray:  None    CT:   12/2024  FINDINGS:   Surrounding soft tissues are unremarkable.   Aortic arch: Normal branching pattern. No significant stenosis.     Right carotid: The common carotid artery is patent without significant stenosis. The ICA is patent. T   here is  0 % stenosis calculated utilizing the aforementioned NASCET criteria. No dissection.     Left carotid: The common carotid artery is patent without significant stenosis. The ICA is patent.  T   here is  0 % stenosis calculated utilizing the aforementioned NASCET criteria. No dissection.     Vertebrals: Both vertebral arteries are patent without any significant stenosis.  No dissection.     There is no significant surrounding soft tissue abnormality. No acute or aggressive osseous lesion ab   normality.     IMPRESSION:   No hemodynamically/flow-limiting significant stenosis or vessel occlusion. No dissection seen.   Unremarkable CTA of the neck.     MRI:  Unable to obtain due to cochlear implant     ---------------  Genetics [facility] 2019:    Kaylee's genetic testing today does provide potential causes for her clinical presentation, but are not definitive. The most likely interpretation of the current testing is that her hearing loss is independent from some of the joint problems that she is having. At present, the parents are most concerned about the hearing loss and asked specifically about things like pressure syndrome, which we were able to reassure them does not appear to be the case in Kaylee.    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 She does not, however, have a second variant, although deletions or duplications elsewhere in the gene cannot be ruled out from the testing that was done. For this reason, given the strong association with hearing loss, we would like to ask for a chromosome microarray to assess duplications and deletions, and also to evaluate whether or not there may be changes that are relevant again to the broader phenotype. This GJB2 variant was not found in her brother, who has normal hearing. The parents are not available. 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 This is an arginine substitution and is quite rare in gnomAD, and is predicted to be damaging. This is a cause of autosomal dominant deafness. Again, Kaylee's sibling is negative.    The change in the FBN1 gene is difficult to interpret. There are reports in the literature of in-frame deletions in FBN1 as a cause of Weill- Marchesani syndrome type 2 (https://www.ncbi.nlm.nih.gov/books/GCJ4849/), which is characterized by short stature, joint stiffness with associated pain, and eye problems, typically with small eyes. Eye problems typically affect the eye in terms of the shape and the lens. Hypermobile joints are not a typical feature. Kaylee reports having worsening difficulty with her eyes. She does see an optometrist each year, and has had any significant problems noted, but has not seen an ophthalmologist, and we asked the family to facilitate this on this occasion. There is an association with potential congenital cardiac lesions in Weill-Marchesani, and Kaylee has had repeated echocardiograms, which have again been normal. FBN1 is associated with other syndromes that are not consistent with her current presentation, including Marfan syndrome, which typically has a tall stature and a series of other skeletal features that are not present today, as well as more specific neurocutaneous syndromes that are  again not likely to be the case for Kaylee and not evident from her skeletal survey. Thus, our best differential to work this up would be the Weill-Marchesani. Interestingly, although Kaylee's full brother has joint problems, including acetabular problems, he does not have this variant, and the variant itself is novel and not seen in the large public databases.    I had a prolonged discussion with the family today regarding genes and chromosomes, DNA, and particular interpretation thereof. We will ask for a chromosome microarray to try and tie up some of these initial findings, and will also ask for some expert help, both on the variant in TNC, as well as the variant in FBN1 from our colleagues who have a particular interest in hearing loss, genetics, and in skeletal dysplasias. Both parents and Kaylee were in agreement with the plan as outlined. We will plan to get back in touch with them as we have additional results. We have asked them to forward any information regarding the ophthalmology visits to us going forward.   ----------------     Assessment & Plan      Kaylee Longoria is a 20 y.o. woman with Weill-Marchesani syndrome (connective tissue disorder), benign hypermobility syndrome, dysmenorrhea, who is presenting to the [facility] for an office visit with a chief complaint of headache.     Last seen 2/4/2025. Plan was to increase topiramate to 50 mg at night. Headache on 10 days per month and 2 days are disabling. She says that her dizziness is no longer an issue. No side effects on the topiramate. She has tried rizatriptan 10 mg and it takes 30 min to work. No side effects.     Overall, doing better. Will plan for 3 month and keep the medicine the same dose for now.    1. Chronic migraine, improving  2. Migraine without aura  3. Sensory phenomenon, bilateral, not meeting criteria for her  4. Occipital neuralgia, right     -- Diagnostic studies and referrals recommended:  None  --  "Preventive:  Topiramate 50 mg at night. Side effects discussed.  -- Supplements: Try Magnesium (oxide, glycinate, citrate, or combination) 400 mg by mouth twice per day (Side effect: stomach upset). Fine at local [employer] or [employer] store. Try Riboflavin (VitB2) 200 mg by mouth twice per day (Side effect: bright yellow urine). Find at [employer] food store ([employer], etc.). Alternatively, there is a combination pill that you can try that has 600 mg of magnesium citrate (can cause loose stools), 400 mg Riboflavin, 300 mg CoQ10, 3 mg Melatonin, and 4000 IU VitD3 called MigraineMD that you can try.  -- Abortive:  Rizatriptan 10 mg with nabumetone 500 mg  -- Nausea/Vomiting:  Reglan 10 mg. Side effects discussed.  -- Medication overuse discussed. Limit the use of as needed medications to less than 2 to 3 days per week or 10 days per month to reduce the risk of medication overuse complications.  -- Future options:  Increase topiramate, switch to zonisamide, anti CGRP agents (caution vascular EDS if present), Botox; repeat nerve blocks, cryoneurolysis of right lesser occipital nerve     -- Recommend lifestyle modifications including the SEEDS for success in headache management, including Sleep hygiene, Exercising regularly, Eating healthy and regular meals, Drinking water and maintaining a headache Diary, and Stress reduction.  -- Therapeutic education and advocacy:        -Access the Migraine Toolkit, [facility] Migraine Patient Toolkit        -Visit the American Migraine Foundation (americanmigrainefoundation.org) website and the Move Against Migraine Facebook group for additional patient education    Pain Psychology Resources:  -- "Harnessing the Power of your Thoughts for Pain Control" - [employer] [place] Back Pain Education Day 2016.   -- "Pain Catastrophizing" - Derek Iglesias education Youtube channel  -- Free 8-week Mindfulness Course - [employer] Mindfulness-Based Stress Reduction  -- [id number] " Free Lucina for stress reduction developed by the [employer]       -- Follow-up recommended:  3 months virtual     Nilton Graves MD  Headache and Pain Management  [facility]    The total time spent for evaluation and management on including reviewing separately obtained history, performing a medically appropriate exam and evaluation, documenting clinical information in the health record, independently interpreting results and communicating them to the patient/family/caregiver, and ordering medications/tests/procedures was 10 minutes. 10 minutes was spent with medical discussion.    [id number] [facility]

## 2025-03-18 ENCOUNTER — OFFICE VISIT (OUTPATIENT)
Dept: NEUROLOGY | Facility: CLINIC | Age: 20
End: 2025-03-18
Payer: COMMERCIAL

## 2025-03-18 DIAGNOSIS — G43.709 CHRONIC MIGRAINE WITHOUT AURA WITHOUT STATUS MIGRAINOSUS, NOT INTRACTABLE: Primary | ICD-10-CM

## 2025-03-18 DIAGNOSIS — M54.81 OCCIPITAL NEURALGIA OF RIGHT SIDE: ICD-10-CM

## 2025-03-19 ENCOUNTER — TELEPHONE (OUTPATIENT)
Dept: NEUROLOGY | Facility: CLINIC | Age: 20
End: 2025-03-19
Payer: COMMERCIAL

## 2025-03-19 NOTE — TELEPHONE ENCOUNTER
Spoke with patient's mother regarding scheduling 3 mth follow up with Dr. Graves as a virtual appointment. Patient is scheduled for 06-18-25 at 1pm with Dr. Graves.

## 2025-03-19 NOTE — TELEPHONE ENCOUNTER
----- Message from Nilton Graves MD sent at 3/18/2025  1:07 PM CDT -----  Regarding: 3 month virtual  3 month virtual!

## 2025-06-17 NOTE — PROGRESS NOTES
Neurology Headache [facility] - [facility]  Return Patient Visit    Telemedicine     The patient location is: [place]  Visit type: Virtual visit with audio and visual only  Each patient to whom he or she provides medical services by telemedicine is:  (1) informed of the relationship between the physician and patient and the respective role of any other health care provider with respect to management of the patient; and (2) notified that he or she may decline to receive medical services by telemedicine and may withdraw from such care at any time.     Subjective      REFERRAL SOURCE  No ref. provider found          CHIEF COMPLAINT/REASON FOR REFERRAL  Headache     HISTORY OF PRESENT ILLNESS  Kaylee Longoria is a 20 y.o. woman with Weill-Marchesani syndrome (connective tissue disorder), benign hypermobility syndrome, dysmenorrhea, who is presenting to the [facility] for an office visit with a chief complaint of headache.     The patient states that they began to experience headaches around childhood. Headache was not bad at that time. She remembers taking over the counter medication (Advil) for pain that helped sometimes. She had headache with menstrual period and remembers that sound sensitivity. She had nausea without vomiting. In her teenage years, headache stayed the same. Over the last 3 years, headache has been the same until recently. She says that around 2024, headache worsened and became daily.      NPV headache characteristics:  Headache Frequency:        Total: 30        Disablin     Duration of attacks: days  Timing to max pain: hours   Time of day when headache is worse?: yes - around the middle of the day  Headache location:    right frontal, temporal, and occipital  Quality: throbbing / pulsating  Intensity: mild moderate moderate-to-severe severe: moderate to severe  Associated symptoms: photophobia, phonophobia, aggravation with routine physical activity, and  nausea  Unilateral cranial autonomic features or restlessness: none  Premonitory symptoms: sound sensitivity  Aura symptoms:   Type: sensory  not really associated with headache - infraorbital bilateral   Duration: migraine aura duration: 1 - 5 minutes (atypical for aura)  Headache Red Flags:        Fevers/Chills/Unintended Weight Loss: no        Focal weakness: no        Thunderclap onset: yes - 4-5 times per week        Start a headache with coughing/sneezing/bending over: no        Headache absolutely worse with certain positions (lying down vs standing up): yes - upright because not resting        Double vision: no        Loss of color vision: no        Pulsatile or whooshing tinnitus: yes - she hears different noises all the time  Triggers: yes - taking off her cochlear implant; weather changes for sure  Neck pain: yes - bilateral R>>L Radiation up and down  Jaw pain/cramping with chewing, e.g., starts/stops when chewing due to pain/fatigue, lockjaw/decreased opening or cramping (including tongue) when eating: no  Symptoms of dysautonomia: postural lightheadedness / dizziness, near fainting when upright, and feels full quickly (early satiety)     Prior non-pharm treatments (biofeedback, CBT, PT, chiropractor, acupuncture, massage): no  History of asthma, constipation, kidney stones: no  Psychiatric comorbidities: yes - anxiety  History of Head Trauma: no  Hypertension, Hyperlipidemia: no  Prior stroke: no  Coronary artery disease: no  Vascular malformation or aneurysm: no - unknown EDS subtype  Caffeine use: yes - 5-6 cups of coffee per day; dr sosa x 1  Sleep comorbidities: Snoring absent, witnessed apneas absent, sleep study no     Family history of headaches:  no     Last dilated eye exam: within the last 12 months Any abnormalities? no     Menstrual status:  Did attacks start around menarche? no  Does the patient currently get their period? yes  Worsening of migraine during menses? yes  Does the patient  "use contraception? Type? no  Is the patient currently pregnant or planning pregnancy in the near future? no  Is the patient menopausal? no  Using HRT? no     Social History:  The patient lives in [place], [place].   Employment: no - on leave currently from [employer] because of her pain   Tobacco use: no  Alcohol use: no  Substances: no  Mood: "Up and down"    ----------------  Interval Events:  2/4/2025: Last seen for NPV 11/26/2024. Plan was CTA head / neck, topiramate 25 mg at night, reglan/nabumetone for rescue. Asked the patient's mom to inquire about the type of EDS that the patient has. Since then, CTA was performed and was negative. The patient states that her pain is improved. She states that she has been having 15 headaches days per month. Of those days, she has had around 3 days of disability per month.     3/18/2025: Last seen 2/4/2025. Plan was to increase topiramate to 50 mg at night. Headache on 10 days per month and 2 days are disabling. She says that her dizziness is no longer an issue. No side effects on the topiramate. She has tried rizatriptan 10 mg and it takes 30 min to work. No side effects.     6/18/2025: Last seen 3/2025. Plan was no changes and follow up in 3 months. She says that her headache pain has continued to be well-controlled. She will have 1 headache per month with no disability.       ----------------     Current Acute Headache Medications:  -- Rizatriptan 10 mg - helpful  -- Reglan 10 mg - not sure  -- Nabumetone 500 mg - not sure     Number of Days with acute medication use per week:  2     Current Prophylactic Headache Medications:  -- sertraline 25 mg daily  -- topiramate 50 mg nightly  -- Magnesium oxide 400 mg     Previous headache treatment that was ineffective or not tolerated:  Acute: None  Preventive: fluoxetine 20 mg  Devices: None    Procedures:   -- 11/2024 - right occipital nerve block / lateral occipitalis - 80% improvement with ongoing benefit    ----------------   "   Past Medical History:   Diagnosis Date    Deaf     Mental disorder         Current Outpatient Medications on File Prior to Visit   Medication Sig Dispense Refill    ascorbic acid, vitamin C, (VITAMIN C) 500 MG tablet Take 500 mg by mouth once daily.      hyoscyamine 0.125 mg TbDL ODT tablet Take 0.125 mg by mouth 2 (two) times daily as needed.      l-methylfolate-b2-b6-b12 (CEREFOLIN) 6-5-50-1 mg Tab Take 1 tablet by mouth once daily.      levocetirizine (XYZAL) 5 MG tablet Take 5 mg by mouth every evening.      magnesium oxide (MAG-OX) 400 mg (241.3 mg magnesium) tablet Take 120 mg by mouth once daily.      metoclopramide HCl (REGLAN) 10 MG tablet Take 1 tablet (10 mg total) by mouth 2 (two) times daily as needed (migraine or nausea). Take with Benadryl 12.5 or 25 mg to reduce risk of restlessness and involuntary muscle contraction (dystonia). 20 tablet 2    nabumetone (RELAFEN) 500 MG tablet Take 1 tablet (500 mg total) by mouth 2 (two) times daily as needed for Pain. 20 tablet 3    rizatriptan (MAXALT-MLT) 10 MG disintegrating tablet Take 1 tablet (10 mg total) by mouth 2 (two) times daily as needed for Migraine. May repeat in 2 hours if needed. Avoid other triptans or DHE (Migranal) in the same 24 hr period. Do not use more than 10 days per month or risk medication overuse headache. 10 tablet 2    sertraline (ZOLOFT) 25 MG tablet Take 25 mg by mouth.      topiramate (TOPAMAX) 50 MG tablet Take 1 tablet (50 mg total) by mouth every evening. 30 tablet 11    traZODone (DESYREL) 50 MG tablet Take 50 mg by mouth every evening.      turmeric root-ginger root ext 150-25 mg Chew Take 250 mg by mouth.       No current facility-administered medications on file prior to visit.        REVIEW OF SYSTEMS  As above     Objective      PHYSICAL EXAMINATION    There were no vitals taken for this visit.     General Exam: The patient is in no acute distress.  Psychiatric: The patient is alert, interactive, and has appropriate  "mood and affect.    Neurologic Examination:  Mental Status: Mental status is normal to conversation. The patient is able to recall the details of their medical history without difficulty. Language is grossly intact with expected dysarthria      ----------------     DIAGNOSTIC DATA     Laboratory Data:     No results found for: "WBC", "HGB", "HCT", "MCV", "PLT"    CMP  No results found for: "NA", "K", "CL", "CO2", "GLU", "BUN", "CREATININE", "CALCIUM", "PROT", "ALBUMIN", "BILITOT", "ALKPHOS", "AST", "ALT", "ANIONGAP", "EGFRNORACEVR"     Imaging Data:    Xray:  None    CT:   12/2024  FINDINGS:   Surrounding soft tissues are unremarkable.   Aortic arch: Normal branching pattern. No significant stenosis.     Right carotid: The common carotid artery is patent without significant stenosis. The ICA is patent. T   here is  0 % stenosis calculated utilizing the aforementioned NASCET criteria. No dissection.     Left carotid: The common carotid artery is patent without significant stenosis. The ICA is patent.  T   here is  0 % stenosis calculated utilizing the aforementioned NASCET criteria. No dissection.     Vertebrals: Both vertebral arteries are patent without any significant stenosis.  No dissection.     There is no significant surrounding soft tissue abnormality. No acute or aggressive osseous lesion ab   normality.     IMPRESSION:   No hemodynamically/flow-limiting significant stenosis or vessel occlusion. No dissection seen.   Unremarkable CTA of the neck.     MRI:  Unable to obtain due to cochlear implant     ---------------  Genetics [facility] 2019:    Kaylee's genetic testing today does provide potential causes for her clinical presentation, but are not definitive. The most likely interpretation of the current testing is that her hearing loss is independent from some of the joint problems that she is having. At present, the parents are most concerned about the hearing loss and asked specifically about things " like pressure syndrome, which we were able to reassure them does not appear to be the case in Kaylee.    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 She does not, however, have a second variant, although deletions or duplications elsewhere in the gene cannot be ruled out from the testing that was done. For this reason, given the strong association with hearing loss, we would like to ask for a chromosome microarray to assess duplications and deletions, and also to evaluate whether or not there may be changes that are relevant again to the broader phenotype. This GJB2 variant was not found in her brother, who has normal hearing. The parents are not available. 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 This is an arginine substitution and is quite rare in gnomAD, and is predicted to be damaging. This is a cause of autosomal dominant deafness. Again, Kaylee's sibling is negative.    The change in the FBN1 gene is difficult to interpret. There are reports in the literature of in-frame deletions in FBN1 as a cause of Weill- Marchesani syndrome type 2 (https://www.ncbi.nlm.nih.gov/books/AJA5196/), which is characterized by short stature, joint stiffness with associated pain, and eye problems, typically with small eyes. Eye problems typically affect the eye in terms of the shape and the lens. Hypermobile joints are not a typical feature. Kaylee reports having worsening difficulty with her eyes. She does see an optometrist each year, and has had any significant problems noted, but has not seen an ophthalmologist, and we asked the family to facilitate this on this occasion. There is an association with potential congenital cardiac lesions in Weill-Marchesani, and Kaylee has had repeated echocardiograms, which have again been normal. FBN1 is associated with other syndromes that are not consistent with her current  presentation, including Marfan syndrome, which typically has a tall stature and a series of other skeletal features that are not present today, as well as more specific neurocutaneous syndromes that are again not likely to be the case for Kaylee and not evident from her skeletal survey. Thus, our best differential to work this up would be the Weill-Marchesani. Interestingly, although Kaylee's full brother has joint problems, including acetabular problems, he does not have this variant, and the variant itself is novel and not seen in the large public databases.    I had a prolonged discussion with the family today regarding genes and chromosomes, DNA, and particular interpretation thereof. We will ask for a chromosome microarray to try and tie up some of these initial findings, and will also ask for some expert help, both on the variant in TNC, as well as the variant in FBN1 from our colleagues who have a particular interest in hearing loss, genetics, and in skeletal dysplasias. Both parents and Kaylee were in agreement with the plan as outlined. We will plan to get back in touch with them as we have additional results. We have asked them to forward any information regarding the ophthalmology visits to us going forward.   ----------------     Assessment & Plan      Kaylee Longoria is a 20 y.o. woman with Weill-Marchesani syndrome (connective tissue disorder), benign hypermobility syndrome, dysmenorrhea, who is presenting to the [facility] for an office visit with a chief complaint of headache.    Last seen 3/2025. Plan was no changes and follow up in 3 months. She says that her headache pain has continued to be well-controlled. She will have 1 headache per month with no disability.    Discussed meds and decided to keep everything the same for now. Follow-up 6 months    1. Chronic migraine, improving  2. Migraine without aura  3. Sensory phenomenon, bilateral, not meeting criteria for her  4.  "Occipital neuralgia, right     -- Diagnostic studies and referrals recommended:  None  -- Preventive:  Topiramate 50 mg at night. Side effects discussed.  -- Supplements: Try Magnesium (oxide, glycinate, citrate, or combination) 400 mg by mouth twice per day (Side effect: stomach upset). Fine at local [employer] or [employer] store. Try Riboflavin (VitB2) 200 mg by mouth twice per day (Side effect: bright yellow urine). Find at [employer] food store (Whole foods, etc.). Alternatively, there is a combination pill that you can try that has 600 mg of magnesium citrate (can cause loose stools), 400 mg Riboflavin, 300 mg CoQ10, 3 mg Melatonin, and 4000 IU VitD3 called MigraineMD that you can try.  -- Abortive:  Rizatriptan 10 mg with nabumetone 500 mg  -- Nausea/Vomiting:  Reglan 10 mg. Side effects discussed.  -- Medication overuse discussed. Limit the use of as needed medications to less than 2 to 3 days per week or 10 days per month to reduce the risk of medication overuse complications.  -- Future options:  Increase topiramate, switch to zonisamide, anti CGRP agents (caution vascular EDS if present), Botox; repeat nerve blocks, cryoneurolysis of right lesser occipital nerve     -- Recommend lifestyle modifications including the SEEDS for success in headache management, including Sleep hygiene, Exercising regularly, Eating healthy and regular meals, Drinking water and maintaining a headache Diary, and Stress reduction.  -- Therapeutic education and advocacy:        -Access the Migraine Toolkit, [facility] Migraine Patient Toolkit        -Visit the American Migraine Foundation (americanmigrainefoundation.org) website and the Move Against Migraine Facebook group for additional patient education    Pain Psychology Resources:  -- "Harnessing the Power of your Thoughts for Pain Control" - Derek Iglesias Back Pain Education Day 2016.   -- "Pain Catastrophizing" - Derek Iglesias education Youtube channel  -- Free " 8-week Mindfulness Course - [place] Mindfulness-Based Stress Reduction  -- Faxvqo2Rxoan Free Lucina for stress reduction developed by the [employer]       -- Follow-up recommended:  6 months      Nilton Graves MD  Headache and Pain Management  [facility]    The total time spent for evaluation and management on including reviewing separately obtained history, performing a medically appropriate exam and evaluation, documenting clinical information in the health record, independently interpreting results and communicating them to the patient/family/caregiver, and ordering medications/tests/procedures was 10 minutes. 10 minutes was spent with medical discussion.    [id number] Telemed established    The patient location is: [place]  The chief complaint leading to consultation is: headache    Visit type: audiovisual    Face to Face time with patient: 10  10 minutes of total time spent on the encounter, which includes face to face time and non-face to face time preparing to see the patient (eg, review of tests), Obtaining and/or reviewing separately obtained history, Documenting clinical information in the electronic or other health record, Independently interpreting results (not separately reported) and communicating results to the patient/family/caregiver, or Care coordination (not separately reported).     Each patient to whom he or she provides medical services by telemedicine is:  (1) informed of the relationship between the physician and patient and the respective role of any other health care provider with respect to management of the patient; and (2) notified that he or she may decline to receive medical services by telemedicine and may withdraw from such care at any time.

## 2025-06-18 ENCOUNTER — OFFICE VISIT (OUTPATIENT)
Dept: NEUROLOGY | Facility: CLINIC | Age: 20
End: 2025-06-18
Payer: COMMERCIAL

## 2025-06-18 DIAGNOSIS — G43.709 CHRONIC MIGRAINE WITHOUT AURA WITHOUT STATUS MIGRAINOSUS, NOT INTRACTABLE: Primary | ICD-10-CM
